# Patient Record
Sex: FEMALE | URBAN - METROPOLITAN AREA
[De-identification: names, ages, dates, MRNs, and addresses within clinical notes are randomized per-mention and may not be internally consistent; named-entity substitution may affect disease eponyms.]

---

## 2024-04-22 ENCOUNTER — OV NP (OUTPATIENT)
Dept: URBAN - METROPOLITAN AREA CLINIC 111 | Facility: CLINIC | Age: 24
End: 2024-04-22
Payer: COMMERCIAL

## 2024-04-22 VITALS
SYSTOLIC BLOOD PRESSURE: 106 MMHG | HEIGHT: 64 IN | BODY MASS INDEX: 23.01 KG/M2 | TEMPERATURE: 97.9 F | HEART RATE: 94 BPM | WEIGHT: 134.8 LBS | DIASTOLIC BLOOD PRESSURE: 72 MMHG

## 2024-04-22 DIAGNOSIS — R14.0 BLOATING: ICD-10-CM

## 2024-04-22 DIAGNOSIS — K59.00 CONSTIPATION, UNSPECIFIED CONSTIPATION TYPE: ICD-10-CM

## 2024-04-22 DIAGNOSIS — K30 INDIGESTION: ICD-10-CM

## 2024-04-22 PROBLEM — 14760008: Status: ACTIVE | Noted: 2024-04-22

## 2024-04-22 PROBLEM — 162031009: Status: ACTIVE | Noted: 2024-04-22

## 2024-04-22 PROCEDURE — 99203 OFFICE O/P NEW LOW 30 MIN: CPT | Performed by: PHYSICIAN ASSISTANT

## 2024-04-22 RX ORDER — PANTOPRAZOLE SODIUM 20 MG/1
1 TABLET TABLET, DELAYED RELEASE ORAL ONCE A DAY
Qty: 30 | Refills: 1 | OUTPATIENT
Start: 2024-04-22

## 2024-04-22 NOTE — HPI-TODAY'S VISIT:
24 y/o female here with bloating and not being able to burp. She was told she has acid reflux by her dentist d/t dental erosion.  H/o indigestion. No heartburn. No dysphagia. No weight loss. Recent surgery for deviated septum. No meds for indigestion but she has taken probiotic and supplements. Reports there was concern for IBS when she was younger but was told she did not have it. Pt has a stool about every two days. She will have a small amount of stool. She had a tiny bit of blood on toilet paper recently from hard stool. Previously took Miralax per PCP but stopped as stools improved. No FH of GI cancer.  Any food or drink other than water causes bloating since high school. She reports a "frog noise" after eating. Voice is not hoarse. No abdominal pain but feels air or gas in epigastrium. Drinks ETOH socially. No tobacco. No NSAIDs.

## 2024-07-11 ENCOUNTER — OFFICE VISIT (OUTPATIENT)
Dept: URBAN - METROPOLITAN AREA CLINIC 111 | Facility: CLINIC | Age: 24
End: 2024-07-11

## 2024-07-12 ENCOUNTER — DASHBOARD ENCOUNTERS (OUTPATIENT)
Age: 24
End: 2024-07-12

## 2024-07-12 ENCOUNTER — OFFICE VISIT (OUTPATIENT)
Dept: URBAN - METROPOLITAN AREA CLINIC 111 | Facility: CLINIC | Age: 24
End: 2024-07-12
Payer: COMMERCIAL

## 2024-07-12 VITALS
HEIGHT: 64 IN | BODY MASS INDEX: 23.22 KG/M2 | SYSTOLIC BLOOD PRESSURE: 124 MMHG | HEART RATE: 64 BPM | WEIGHT: 136 LBS | DIASTOLIC BLOOD PRESSURE: 76 MMHG | TEMPERATURE: 98.1 F

## 2024-07-12 DIAGNOSIS — R10.30 LOWER ABDOMINAL PAIN: ICD-10-CM

## 2024-07-12 DIAGNOSIS — K60.2 ANAL FISSURE: ICD-10-CM

## 2024-07-12 DIAGNOSIS — K59.09 CHANGE IN BOWEL MOVEMENTS INTERMITTENT CONSTIPATION. URGENCY IN THE MORNING.: ICD-10-CM

## 2024-07-12 DIAGNOSIS — K62.89 RECTAL PAIN: ICD-10-CM

## 2024-07-12 PROCEDURE — 99214 OFFICE O/P EST MOD 30 MIN: CPT | Performed by: PHYSICIAN ASSISTANT

## 2024-07-12 RX ORDER — PANTOPRAZOLE SODIUM 20 MG/1
1 TABLET TABLET, DELAYED RELEASE ORAL ONCE A DAY
Qty: 30 | Refills: 1 | Status: ON HOLD | COMMUNITY
Start: 2024-04-22

## 2024-07-12 NOTE — HPI-TODAY'S VISIT:
22 y/o female here with constipation. Seen by me on 4/22 for indigestion, constipation, and bloating. She was told by her dentist she has erosion from reflux. She also reported being unable to burp. Pt was told to do 2 month course of Pantoprazole 20 mg daily. She was told to start Miralax daily and given low fodmap diet info.   Pt is doing okay. She did not take PPI as she felt like symptoms got better. She felt like ETOH was causing an issue so she cut down on it. Drinking socially in college. She has been having rectal pain with every stool for the past month. She has also been seeing more blood this past week. Blood is on toilet paper. Miralax has not helped. She has not had a normal stool for 2 weeks. Stool has been very hard. She has only had pellets the last 2 weeks. Having abdominal pain and discomfort in lower abdomen which is worse at night.   Previous: Pt has a stool about every two days. She will have a small amount of stool. She had a tiny bit of blood on toilet paper recently from hard stool. Previously took Miralax per PCP but stopped as stools improved. No FH of GI cancer.